# Patient Record
Sex: FEMALE | Race: WHITE
[De-identification: names, ages, dates, MRNs, and addresses within clinical notes are randomized per-mention and may not be internally consistent; named-entity substitution may affect disease eponyms.]

---

## 2020-12-23 ENCOUNTER — HOSPITAL ENCOUNTER (OUTPATIENT)
Dept: HOSPITAL 19 - COL.RAD | Age: 45
Discharge: HOME | End: 2020-12-23
Attending: INTERNAL MEDICINE
Payer: MEDICARE

## 2020-12-23 VITALS — HEART RATE: 87 BPM | SYSTOLIC BLOOD PRESSURE: 129 MMHG | DIASTOLIC BLOOD PRESSURE: 88 MMHG

## 2020-12-23 VITALS — HEART RATE: 80 BPM | TEMPERATURE: 97.7 F | DIASTOLIC BLOOD PRESSURE: 71 MMHG | SYSTOLIC BLOOD PRESSURE: 140 MMHG

## 2020-12-23 VITALS — DIASTOLIC BLOOD PRESSURE: 75 MMHG | HEART RATE: 85 BPM | SYSTOLIC BLOOD PRESSURE: 129 MMHG

## 2020-12-23 VITALS — WEIGHT: 200.4 LBS | BODY MASS INDEX: 30.37 KG/M2 | HEIGHT: 67.99 IN

## 2020-12-23 DIAGNOSIS — I63.9: Primary | ICD-10-CM

## 2020-12-23 LAB
ANION GAP SERPL CALC-SCNC: 8 MMOL/L (ref 7–16)
BUN SERPL-MCNC: 19 MG/DL (ref 7–17)
CALCIUM SERPL-MCNC: 9 MG/DL (ref 8.4–10.2)
CHLORIDE SERPL-SCNC: 106 MMOL/L (ref 98–107)
CO2 SERPL-SCNC: 25 MMOL/L (ref 22–30)
CREAT SERPL-SCNC: 0.62 UMOL/L (ref 0.52–1.25)
ERYTHROCYTE [DISTWIDTH] IN BLOOD BY AUTOMATED COUNT: 12.9 % (ref 11.5–14.5)
GLUCOSE SERPL-MCNC: 98 MG/DL (ref 74–106)
HCT VFR BLD AUTO: 42.8 % (ref 37–47)
HGB BLD-MCNC: 14 G/DL (ref 12.5–16)
INR BLD: 1 (ref 0.8–3)
MCH RBC QN AUTO: 29 PG (ref 27–31)
MCHC RBC AUTO-ENTMCNC: 33 G/DL (ref 33–37)
MCV RBC AUTO: 88 FL (ref 80–100)
PLATELET # BLD AUTO: 312 K/MM3 (ref 130–400)
PMV BLD AUTO: 10.2 FL (ref 7.4–10.4)
POTASSIUM SERPL-SCNC: 4.4 MMOL/L (ref 3.4–5)
PROTHROMBIN TIME: 11.1 SECONDS (ref 9.7–12.8)
RBC # BLD AUTO: 4.85 M/MM3 (ref 4.1–5.3)
SODIUM SERPL-SCNC: 139 MMOL/L (ref 137–145)

## 2020-12-23 PROCEDURE — C1764 EVENT RECORDER, CARDIAC: HCPCS

## 2020-12-23 NOTE — NUR
Report received from Yanira POWELL.  Pt is awake and alert, states is ready to go
home.  I reviewed dc instructions regarding home care post [place], loop and
sedation.  pt verbalized understanding.  IV dc'd with cath intact dressing
applied.  Loop site covered with clean and dry gauze.

## 2020-12-23 NOTE — NUR
Pt is up and ambulatory in room with steady gait.  I have reviewed fu and rx
instructions with patient who verbalized understanding.  Pt has been able to
drink with no problem swallowing.  Pt escorted to exit via wheelchair.

## 2024-10-11 NOTE — NUR
PT TOLERATED RECOVERY PERIOD WELL. VS REMAINED WITHIN NORMAL LIMITS. PT FREE
FROM ACUTE CONCERNS AND COMPLAINTS UPON DISCHARGE. IV DISCONTINUED. PT
ASSISTED TO MAIN LOBBY VIA WHEELCHAIR. PT VERBALIZED UNDERSTANDING OF
DISCHARGE INSTRUCTIONS.